# Patient Record
Sex: FEMALE | Race: WHITE | NOT HISPANIC OR LATINO | URBAN - METROPOLITAN AREA
[De-identification: names, ages, dates, MRNs, and addresses within clinical notes are randomized per-mention and may not be internally consistent; named-entity substitution may affect disease eponyms.]

---

## 2023-06-29 ENCOUNTER — OFFICE VISIT (OUTPATIENT)
Dept: URBAN - METROPOLITAN AREA CLINIC 70 | Facility: CLINIC | Age: 63
End: 2023-06-29
Payer: COMMERCIAL

## 2023-06-29 ENCOUNTER — LAB OUTSIDE AN ENCOUNTER (OUTPATIENT)
Dept: URBAN - METROPOLITAN AREA CLINIC 70 | Facility: CLINIC | Age: 63
End: 2023-06-29

## 2023-06-29 ENCOUNTER — WEB ENCOUNTER (OUTPATIENT)
Dept: URBAN - METROPOLITAN AREA CLINIC 70 | Facility: CLINIC | Age: 63
End: 2023-06-29

## 2023-06-29 VITALS
WEIGHT: 166.6 LBS | SYSTOLIC BLOOD PRESSURE: 183 MMHG | BODY MASS INDEX: 26.15 KG/M2 | HEART RATE: 69 BPM | HEIGHT: 67 IN | DIASTOLIC BLOOD PRESSURE: 81 MMHG

## 2023-06-29 DIAGNOSIS — R19.5 POSITIVE COLORECTAL CANCER SCREENING USING COLOGUARD TEST: ICD-10-CM

## 2023-06-29 DIAGNOSIS — K22.2 SCHATZKI'S RING: ICD-10-CM

## 2023-06-29 PROCEDURE — 99214 OFFICE O/P EST MOD 30 MIN: CPT

## 2023-06-29 RX ORDER — GABAPENTIN 100 MG/1
1 CAPSULE CAPSULE ORAL ONCE A DAY
Status: ACTIVE | COMMUNITY

## 2023-06-29 NOTE — HPI-TODAY'S VISIT:
The patient presents for colon cancer screening and Schatski ring. Last colonoscopy was done 17+years ago with normal results.  She states she has been performing the FIT test every year with normal results.  This year she underwent a cologuard test and results were positive.  There is no family history of colon cancer.  There is a fhx of Crohn's Disease in her father. She admits to constipation, but states taking Magnesium supplementation improves symptoms.  She admits to occasional rectal bleeding due to hx of hemorrhoids from childbirth. They deny any abdominal pain, diarrhea, or weight loss. She has a hx of Schatski's ring and has experienced 2 dilations in the past.  Last dilation was 10 years ago.  She admits to dysphagia to solids and pill medications.  She would like to have a repeat dilation. She denies nausea, vomiting, or melena.

## 2023-07-06 ENCOUNTER — TELEPHONE ENCOUNTER (OUTPATIENT)
Dept: URBAN - METROPOLITAN AREA CLINIC 70 | Facility: CLINIC | Age: 63
End: 2023-07-06

## 2023-07-17 ENCOUNTER — OFFICE VISIT (OUTPATIENT)
Dept: URBAN - METROPOLITAN AREA MEDICAL CENTER 42 | Facility: MEDICAL CENTER | Age: 63
End: 2023-07-17
Payer: COMMERCIAL

## 2023-07-17 DIAGNOSIS — D12.2 ADENOMA OF ASCENDING COLON: ICD-10-CM

## 2023-07-17 DIAGNOSIS — R19.5 ABNORMAL CONSISTENCY OF STOOL: ICD-10-CM

## 2023-07-17 DIAGNOSIS — D12.5 ADENOMA OF SIGMOID COLON: ICD-10-CM

## 2023-07-17 DIAGNOSIS — K22.2 ACQUIRED ESOPHAGEAL RING: ICD-10-CM

## 2023-07-17 PROCEDURE — 45385 COLONOSCOPY W/LESION REMOVAL: CPT | Performed by: INTERNAL MEDICINE

## 2023-07-17 PROCEDURE — 43249 ESOPH EGD DILATION <30 MM: CPT | Performed by: INTERNAL MEDICINE

## 2023-07-17 RX ORDER — GABAPENTIN 100 MG/1
1 CAPSULE CAPSULE ORAL ONCE A DAY
Status: DISCONTINUED | COMMUNITY

## 2023-08-14 ENCOUNTER — OFFICE VISIT (OUTPATIENT)
Dept: URBAN - METROPOLITAN AREA CLINIC 70 | Facility: CLINIC | Age: 63
End: 2023-08-14
Payer: COMMERCIAL

## 2023-08-14 ENCOUNTER — DASHBOARD ENCOUNTERS (OUTPATIENT)
Age: 63
End: 2023-08-14

## 2023-08-14 VITALS
HEIGHT: 67 IN | DIASTOLIC BLOOD PRESSURE: 98 MMHG | SYSTOLIC BLOOD PRESSURE: 183 MMHG | BODY MASS INDEX: 25.99 KG/M2 | HEART RATE: 73 BPM | WEIGHT: 165.6 LBS

## 2023-08-14 DIAGNOSIS — K22.2 ESOPHAGEAL STRICTURE: ICD-10-CM

## 2023-08-14 DIAGNOSIS — K21.9 GASTROESOPHAGEAL REFLUX DISEASE WITHOUT ESOPHAGITIS: ICD-10-CM

## 2023-08-14 DIAGNOSIS — Z86.010 HX OF COLONIC POLYPS: ICD-10-CM

## 2023-08-14 PROBLEM — 266435005: Status: ACTIVE | Noted: 2023-08-14

## 2023-08-14 PROBLEM — 63305008: Status: ACTIVE | Noted: 2023-08-14

## 2023-08-14 PROBLEM — 428283002: Status: ACTIVE | Noted: 2023-08-14

## 2023-08-14 PROCEDURE — 99214 OFFICE O/P EST MOD 30 MIN: CPT

## 2023-08-14 RX ORDER — METHYLDOPA/HYDROCHLOROTHIAZIDE 250MG-15MG
AS DIRECTED TABLET ORAL
Status: ACTIVE | COMMUNITY

## 2023-08-14 RX ORDER — MELATONIN 5 MG
1 TABLET IN THE EVENING TABLET ORAL ONCE A DAY
Status: ACTIVE | COMMUNITY

## 2023-08-14 RX ORDER — OMEPRAZOLE 20 MG/1
1 CAPSULE 30 MINUTES BEFORE MORNING MEAL CAPSULE, DELAYED RELEASE ORAL ONCE A DAY
OUTPATIENT

## 2023-08-14 RX ORDER — PHENOL 1.4 %
AS DIRECTED AEROSOL, SPRAY (ML) MUCOUS MEMBRANE
Status: ACTIVE | COMMUNITY

## 2023-08-14 RX ORDER — GABAPENTIN 100 MG/1
1 CAPSULE CAPSULE ORAL ONCE A DAY
Status: ACTIVE | COMMUNITY

## 2023-08-14 RX ORDER — OMEPRAZOLE 20 MG/1
1 CAPSULE 30 MINUTES BEFORE MORNING MEAL CAPSULE, DELAYED RELEASE ORAL ONCE A DAY
Status: ACTIVE | COMMUNITY

## 2023-08-14 RX ORDER — ASCORBIC ACID 125 MG
AS DIRECTED TABLET,CHEWABLE ORAL
Status: ACTIVE | COMMUNITY

## 2023-08-14 NOTE — HPI-OTHER HISTORIES
OV 6/29/2023: The patient presents for colon cancer screening and Schatski ring. Last colonoscopy was done 17+years ago with normal results.  She states she has been performing the FIT test every year with normal results.  This year she underwent a cologuard test and results were positive.  There is no family history of colon cancer.  There is a fhx of Crohn's Disease in her father. She admits to constipation, but states taking Magnesium supplementation improves symptoms.  She admits to occasional rectal bleeding due to hx of hemorrhoids from childbirth. They deny any abdominal pain, diarrhea, or weight loss. She has a hx of Schatski's ring and has experienced 2 dilations in the past.  Last dilation was 10 years ago.  She admits to dysphagia to solids and pill medications.  She would like to have a repeat dilation. She denies nausea, vomiting, or melena.

## 2023-08-14 NOTE — HPI-TODAY'S VISIT:
The pt presents for a procedure f/u.  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 Colonoscopy 7/17/2023 showed a 3mm TA in the ascending colon and a 2cm TA in the sigmoid colon. Recall 3 years. Today the pt presents with her .  They have multiple questions regarding the procedure results. She states dysphagia has improved since the procedure.  She remains on Omeprazole 20mg and states GERD symptoms are well controlled. No other GI symptoms.